# Patient Record
Sex: FEMALE | Race: OTHER | Employment: FULL TIME | URBAN - METROPOLITAN AREA
[De-identification: names, ages, dates, MRNs, and addresses within clinical notes are randomized per-mention and may not be internally consistent; named-entity substitution may affect disease eponyms.]

---

## 2024-07-13 ENCOUNTER — HOSPITAL ENCOUNTER (EMERGENCY)
Facility: HOSPITAL | Age: 39
Discharge: HOME | End: 2024-07-13
Attending: STUDENT IN AN ORGANIZED HEALTH CARE EDUCATION/TRAINING PROGRAM
Payer: COMMERCIAL

## 2024-07-13 ENCOUNTER — APPOINTMENT (OUTPATIENT)
Facility: HOSPITAL | Age: 39
End: 2024-07-13
Payer: COMMERCIAL

## 2024-07-13 VITALS
DIASTOLIC BLOOD PRESSURE: 74 MMHG | WEIGHT: 161.38 LBS | SYSTOLIC BLOOD PRESSURE: 106 MMHG | HEART RATE: 66 BPM | RESPIRATION RATE: 16 BRPM | OXYGEN SATURATION: 100 % | TEMPERATURE: 98.5 F

## 2024-07-13 DIAGNOSIS — M54.2 NECK PAIN: ICD-10-CM

## 2024-07-13 DIAGNOSIS — M79.601 PAIN OF RIGHT UPPER EXTREMITY: Primary | ICD-10-CM

## 2024-07-13 PROCEDURE — 6370000000 HC RX 637 (ALT 250 FOR IP): Performed by: EMERGENCY MEDICINE

## 2024-07-13 PROCEDURE — 6360000002 HC RX W HCPCS: Performed by: EMERGENCY MEDICINE

## 2024-07-13 PROCEDURE — 72040 X-RAY EXAM NECK SPINE 2-3 VW: CPT

## 2024-07-13 PROCEDURE — 96372 THER/PROPH/DIAG INJ SC/IM: CPT

## 2024-07-13 PROCEDURE — 99284 EMERGENCY DEPT VISIT MOD MDM: CPT

## 2024-07-13 RX ORDER — METHOCARBAMOL 750 MG/1
750 TABLET, FILM COATED ORAL 3 TIMES DAILY
Qty: 15 TABLET | Refills: 0 | Status: SHIPPED | OUTPATIENT
Start: 2024-07-13 | End: 2024-07-18

## 2024-07-13 RX ORDER — DEXAMETHASONE SODIUM PHOSPHATE 10 MG/ML
10 INJECTION, SOLUTION INTRAMUSCULAR; INTRAVENOUS
Status: COMPLETED | OUTPATIENT
Start: 2024-07-13 | End: 2024-07-13

## 2024-07-13 RX ORDER — METHOCARBAMOL 500 MG/1
750 TABLET, FILM COATED ORAL
Status: COMPLETED | OUTPATIENT
Start: 2024-07-13 | End: 2024-07-13

## 2024-07-13 RX ORDER — KETOROLAC TROMETHAMINE 30 MG/ML
30 INJECTION, SOLUTION INTRAMUSCULAR; INTRAVENOUS
Status: COMPLETED | OUTPATIENT
Start: 2024-07-13 | End: 2024-07-13

## 2024-07-13 RX ORDER — PREDNISONE 20 MG/1
20 TABLET ORAL DAILY
Qty: 5 TABLET | Refills: 0 | Status: SHIPPED | OUTPATIENT
Start: 2024-07-13 | End: 2024-07-18

## 2024-07-13 RX ORDER — KETOROLAC TROMETHAMINE 10 MG/1
10 TABLET, FILM COATED ORAL 3 TIMES DAILY PRN
Qty: 15 TABLET | Refills: 0 | Status: SHIPPED | OUTPATIENT
Start: 2024-07-13

## 2024-07-13 RX ORDER — FAMOTIDINE 20 MG/1
20 TABLET, FILM COATED ORAL
Status: COMPLETED | OUTPATIENT
Start: 2024-07-13 | End: 2024-07-13

## 2024-07-13 RX ADMIN — METHOCARBAMOL 750 MG: 500 TABLET ORAL at 09:45

## 2024-07-13 RX ADMIN — KETOROLAC TROMETHAMINE 30 MG: 30 INJECTION, SOLUTION INTRAMUSCULAR at 09:46

## 2024-07-13 RX ADMIN — DEXAMETHASONE SODIUM PHOSPHATE 10 MG: 10 INJECTION, SOLUTION INTRAMUSCULAR; INTRAVENOUS at 09:44

## 2024-07-13 RX ADMIN — FAMOTIDINE 20 MG: 20 TABLET ORAL at 09:45

## 2024-07-13 ASSESSMENT — PAIN DESCRIPTION - LOCATION: LOCATION: HAND

## 2024-07-13 ASSESSMENT — PAIN - FUNCTIONAL ASSESSMENT
PAIN_FUNCTIONAL_ASSESSMENT: PREVENTS OR INTERFERES SOME ACTIVE ACTIVITIES AND ADLS
PAIN_FUNCTIONAL_ASSESSMENT: 0-10

## 2024-07-13 ASSESSMENT — PAIN DESCRIPTION - ORIENTATION: ORIENTATION: OTHER (COMMENT)

## 2024-07-13 ASSESSMENT — ENCOUNTER SYMPTOMS
VOMITING: 0
NAUSEA: 0
ABDOMINAL PAIN: 0
COUGH: 0
SHORTNESS OF BREATH: 0

## 2024-07-13 ASSESSMENT — PAIN DESCRIPTION - DESCRIPTORS: DESCRIPTORS: OTHER (COMMENT)

## 2024-07-13 ASSESSMENT — PAIN SCALES - GENERAL: PAINLEVEL_OUTOF10: 10

## 2024-07-13 NOTE — ED TRIAGE NOTES
Triage done with [facility]- pt c/o right arm pain from neck down, +numbness to right hand, stared 2 months ago

## 2024-07-13 NOTE — ED PROVIDER NOTES
[facility] EMERGENCY DEP  EMERGENCY DEPARTMENT ENCOUNTER      Pt Name: Karen Tamez  MRN: [id number]  Birthdate 1985  Date of evaluation: 7/13/2024  Provider: YARIEL Zhou NP    CHIEF COMPLAINT       Chief Complaint   Patient presents with    Numbness    Hand Pain         HISTORY OF PRESENT ILLNESS   (Location/Symptom, Timing/Onset, Context/Setting, Quality, Duration, Modifying Factors, Severity)  Note limiting factors.   HPI  Patient is a 38-year-old  female with no significant past medical history who presents to the ED with right-sided neck pain that radiates through the right arm to her fingers for over 2 months.  She is right-hand dominant.  She denies any falls, direct injury or blunt trauma.  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 There is no obvious bony or soft tissue deformity; no rash, bruising or extending erythema. Good neurovascular sensation.  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    REVIEW OF SYSTEMS    (2-9 systems for level 4, 10 or more for level 5)     Review of Systems   Constitutional:  Negative for activity change, appetite change, fever and unexpected weight change.   Eyes:  Negative for visual disturbance.   Respiratory:  Negative for cough and shortness of breath.    Cardiovascular:  Negative for chest pain, palpitations and leg swelling.   Gastrointestinal:  Negative for abdominal pain, nausea and vomiting.   Musculoskeletal:  Positive for neck pain. Negative for gait problem.   Skin:  Negative for rash.   Neurological:  Negative for headaches.   All other systems reviewed and are negative.      Except as noted above the remainder of the review of systems was reviewed and negative.       PAST MEDICAL HISTORY   No past medical history on file.      SURGICAL HISTORY     No past surgical history on

## 2024-11-27 ENCOUNTER — APPOINTMENT (OUTPATIENT)
Facility: HOSPITAL | Age: 39
End: 2024-11-27
Payer: COMMERCIAL

## 2024-11-27 ENCOUNTER — HOSPITAL ENCOUNTER (EMERGENCY)
Facility: HOSPITAL | Age: 39
Discharge: HOME | End: 2024-11-27
Attending: EMERGENCY MEDICINE
Payer: COMMERCIAL

## 2024-11-27 VITALS
TEMPERATURE: 98.3 F | OXYGEN SATURATION: 98 % | DIASTOLIC BLOOD PRESSURE: 87 MMHG | RESPIRATION RATE: 17 BRPM | HEART RATE: 70 BPM | WEIGHT: 159.61 LBS | SYSTOLIC BLOOD PRESSURE: 132 MMHG

## 2024-11-27 DIAGNOSIS — S16.1XXA STRAIN OF NECK MUSCLE, INITIAL ENCOUNTER: ICD-10-CM

## 2024-11-27 DIAGNOSIS — M79.10 MUSCULAR PAIN: Primary | ICD-10-CM

## 2024-11-27 LAB
APPEARANCE UR: CLEAR
BACTERIA URNS QL MICRO: NEGATIVE /HPF
BILIRUB UR QL: NEGATIVE
COLOR UR: ABNORMAL
EPITH CASTS URNS QL MICRO: ABNORMAL /LPF
GLUCOSE UR STRIP.AUTO-MCNC: NEGATIVE MG/DL
HCG UR QL: NEGATIVE
HGB UR QL STRIP: ABNORMAL
KETONES UR QL STRIP.AUTO: NEGATIVE MG/DL
LEUKOCYTE ESTERASE UR QL STRIP.AUTO: ABNORMAL
NITRITE UR QL STRIP.AUTO: NEGATIVE
PH UR STRIP: 5.5 (ref 5–8)
PROT UR STRIP-MCNC: NEGATIVE MG/DL
RBC #/AREA URNS HPF: ABNORMAL /HPF (ref 0–5)
SP GR UR REFRACTOMETRY: 1.01 (ref 1–1.03)
SPECIMEN HOLD: NORMAL
UROBILINOGEN UR QL STRIP.AUTO: 0.2 EU/DL (ref 0.2–1)
WBC URNS QL MICRO: ABNORMAL /HPF (ref 0–4)

## 2024-11-27 PROCEDURE — 81001 URINALYSIS AUTO W/SCOPE: CPT

## 2024-11-27 PROCEDURE — 72125 CT NECK SPINE W/O DYE: CPT

## 2024-11-27 PROCEDURE — 6370000000 HC RX 637 (ALT 250 FOR IP): Performed by: NURSE PRACTITIONER

## 2024-11-27 PROCEDURE — 99284 EMERGENCY DEPT VISIT MOD MDM: CPT

## 2024-11-27 PROCEDURE — 81025 URINE PREGNANCY TEST: CPT

## 2024-11-27 RX ORDER — CYCLOBENZAPRINE HCL 10 MG
10 TABLET ORAL 3 TIMES DAILY PRN
Qty: 15 TABLET | Refills: 0 | Status: SHIPPED | OUTPATIENT
Start: 2024-11-27 | End: 2024-12-07

## 2024-11-27 RX ORDER — CYCLOBENZAPRINE HCL 10 MG
10 TABLET ORAL ONCE
Status: COMPLETED | OUTPATIENT
Start: 2024-11-27 | End: 2024-11-27

## 2024-11-27 RX ADMIN — CYCLOBENZAPRINE 10 MG: 10 TABLET, FILM COATED ORAL at 15:19

## 2024-11-27 ASSESSMENT — PAIN DESCRIPTION - LOCATION: LOCATION: BACK

## 2024-11-27 ASSESSMENT — PAIN DESCRIPTION - ORIENTATION: ORIENTATION: LOWER

## 2024-11-27 ASSESSMENT — PAIN SCALES - GENERAL: PAINLEVEL_OUTOF10: 8

## 2024-11-27 ASSESSMENT — ENCOUNTER SYMPTOMS
VOMITING: 0
BACK PAIN: 1
SHORTNESS OF BREATH: 0

## 2024-11-27 ASSESSMENT — PAIN DESCRIPTION - DESCRIPTORS: DESCRIPTORS: DISCOMFORT

## 2024-11-27 NOTE — ED PROVIDER NOTES
Percentile Diastolic BP Percentile Temp Temp Source Pulse Respirations SpO2   132/87 -- -- 98.3 °F (36.8 °C) Oral 70 17 98 %      Height Weight - Scale         -- 72.4 kg (159 lb 9.8 oz)             There is no height or weight on file to calculate BMI.    Physical Exam        EMERGENCY DEPARTMENT COURSE and DIFFERENTIAL DIAGNOSIS/MDM:   Vitals:    Vitals:    11/27/24 1423   BP: 132/87   Pulse: 70   Resp: 17   Temp: 98.3 °F (36.8 °C)   TempSrc: Oral   SpO2: 98%   Weight: 72.4 kg (159 lb 9.8 oz)         Medical Decision Making  Amount and/or Complexity of Data Reviewed  Labs: ordered.  Radiology: ordered.    Risk  Prescription drug management.        REASSESSMENT          CONSULTS:  None    PROCEDURES:     Procedures    Labs Reviewed   URINALYSIS WITH MICROSCOPIC - Abnormal; Notable for the following components:       Result Value    Blood, Urine MODERATE (*)     Leukocyte Esterase, Urine TRACE (*)     All other components within normal limits   URINE CULTURE HOLD SAMPLE   POC PREGNANCY UR-QUAL   POC PREGNANCY UR-QUAL       CT CSpine W/O Contrast   Final Result   No acute fracture or subluxation. CT myelography or MR can be   performed for further evaluation, as indicated.         Electronically signed by Magnus Sprague MD          4:43 PM  Pt has been reexamined.  Pt has no new complaints, changes or physical findings. Care plan outlined and precautions discussed. All available results were reviewed with pt. All medications were reviewed with pt. All of pt's questions and concerns were addressed. Pt agrees to F/U as instructed and agrees to return to ED upon further deterioration. Pt is ready to go home.  YARIEL Mi NP      (Please note that portions of this note were completed with a voice recognition program.  Efforts were made to edit the dictations but occasionally words are mis-transcribed.)